# Patient Record
(demographics unavailable — no encounter records)

---

## 2024-10-07 NOTE — HISTORY OF PRESENT ILLNESS
[No] : Patient does not have concerns regarding sex [Currently Active] : currently active [Men] : men [Vaginal] : vaginal [TextBox_4] : Stopped taking nuva ring in August. Originally started on it for bc, heavy menses, PMS. She is not SA. So far she has not been bothered by heavy periods or PMS.  [PapSmeardate] : 8/2023

## 2024-10-07 NOTE — PHYSICAL EXAM
[Appropriately responsive] : appropriately responsive [Alert] : alert [No Acute Distress] : no acute distress [Soft] : soft [Non-tender] : non-tender [Examination Of The Breasts] : a normal appearance [No Masses] : no breast masses were palpable [Labia Majora] : normal [Labia Minora] : normal [Normal] : normal [Tenderness] : nontender [Enlarged ___ wks] : not enlarged [Mass ___ cm] : no uterine mass was palpated [Uterine Adnexae] : non-palpable [FreeTextEntry5] : pap not done

## 2025-04-14 NOTE — DISCUSSION/SUMMARY
[FreeTextEntry1] : LISE lewis pt. No anatomic abn, first abn period, perimenopause reviewed. Menstrual migraines also discussed. low dose ocp discussed as option to tx menstrual irregularities as well as menstrual migraines. She will consider. Lifetime risk of breast cancer 20.3%: discussed option of breast MRI in 6 months. if she decides she wants to do that will issue rx at annual exam in 6 months.

## 2025-04-14 NOTE — HISTORY OF PRESENT ILLNESS
[FreeTextEntry1] : pt states had menses 3/3 , then 3/19 had spotting then turned into a real period which just ended 4/10. Had tvus 4/7. normal, corpus luteum noted. mother 45 at menopause no hot flashes or night sweats She states 90% of time when she gets a migraine (no aura), it is associated with period.